# Patient Record
Sex: MALE | Race: WHITE | URBAN - METROPOLITAN AREA
[De-identification: names, ages, dates, MRNs, and addresses within clinical notes are randomized per-mention and may not be internally consistent; named-entity substitution may affect disease eponyms.]

---

## 2024-10-22 ENCOUNTER — APPOINTMENT (OUTPATIENT)
Facility: CLINIC | Age: 34
End: 2024-10-22
Payer: COMMERCIAL

## 2024-10-22 VITALS
HEART RATE: 108 BPM | WEIGHT: 230 LBS | DIASTOLIC BLOOD PRESSURE: 80 MMHG | SYSTOLIC BLOOD PRESSURE: 128 MMHG | BODY MASS INDEX: 31.15 KG/M2 | OXYGEN SATURATION: 97 % | HEIGHT: 72 IN

## 2024-10-22 DIAGNOSIS — K42.9 UMBILICAL HERNIA WITHOUT OBSTRUCTION AND WITHOUT GANGRENE: Primary | ICD-10-CM

## 2024-10-22 PROCEDURE — 99203 OFFICE O/P NEW LOW 30 MIN: CPT | Performed by: SURGERY

## 2024-10-22 PROCEDURE — 3008F BODY MASS INDEX DOCD: CPT | Performed by: SURGERY

## 2024-10-22 PROCEDURE — 1036F TOBACCO NON-USER: CPT | Performed by: SURGERY

## 2024-10-22 RX ORDER — RIZATRIPTAN BENZOATE 10 MG/1
10 TABLET ORAL AS NEEDED
COMMUNITY

## 2024-10-22 RX ORDER — ATENOLOL 25 MG/1
25 TABLET ORAL DAILY
COMMUNITY

## 2024-10-22 ASSESSMENT — ENCOUNTER SYMPTOMS
EYE PAIN: 0
AGITATION: 0
ARTHRALGIAS: 0
FEVER: 0
DYSURIA: 0
VOMITING: 0
POLYPHAGIA: 0
SHORTNESS OF BREATH: 0
FLANK PAIN: 0
COUGH: 0
CONFUSION: 0
HEADACHES: 0
CONSTIPATION: 0
FATIGUE: 0
WEAKNESS: 0
WOUND: 0
SPEECH DIFFICULTY: 0
NAUSEA: 0
CHILLS: 0
ABDOMINAL PAIN: 1
DIARRHEA: 0
FREQUENCY: 0
HEMATURIA: 0
MYALGIAS: 0
EYE REDNESS: 0
BRUISES/BLEEDS EASILY: 0

## 2024-10-22 NOTE — PATIENT INSTRUCTIONS
1.  Surgery for repair of your bellybutton hernia is scheduled for Monday, 11/18/2024.  Please, read the patient preoperative instruction sheet BEFORE your surgery.

## 2024-10-22 NOTE — PROGRESS NOTES
GENERAL SURGERY OFFICE NOTE    Patient: Luiz Carnes    Age: 34 y.o.   Gender: male    MRN: [id number]    PCP: No primary care provider on file.        SUBJECTIVE     Chief Complaint  New Patient Visit (Patient is a self referral for possible umbilical hernia. Patient states that the hernia appeared slowly, he is unsure how long he has had the hernia. Patient states that he has a bulge underneath of belly button. Patient states that the bulge is soft. Patient states that the hernia does cause him pain at times. )       CARLO Dee is a 34-year-old white male who referred himself to the office for evaluation of an umbilical hernia.  He states he has noticed a lump on the inferior aspect of his umbilicus for approximately 2 years.  He did have an infection in the umbilical region about a year ago which was treated with oral antibiotics.  He has not had any drainage since that time.  Initially, he did not notice any pain.  In the last few months, he has had some mild discomfort associated with the bulge.  He does feel that the bulge is getting larger.  He used to work at [employer] where he did a lot of repetitive heavy lifting which may have caused some irritation to the hernia.  He has not had any obstructive symptoms.  Previous surgical intervention only was an open appendectomy in 2016.  He has not had any radiographic evaluation for the hernia.    ROS  Review of Systems   Constitutional:  Negative for chills, fatigue and fever.   HENT:  Negative for congestion, ear pain and hearing loss.    Eyes:  Negative for pain and redness.   Respiratory:  Negative for cough and shortness of breath.    Cardiovascular:  Negative for chest pain and leg swelling.   Gastrointestinal:  Positive for abdominal pain. Negative for constipation, diarrhea, nausea and vomiting.   Endocrine: Negative for polyphagia.   Genitourinary:  Negative for dysuria, flank pain, frequency and hematuria.   Musculoskeletal:  Negative for arthralgias  and myalgias.   Skin:  Negative for rash and wound.   Allergic/Immunologic: Negative for immunocompromised state.   Neurological:  Negative for speech difficulty, weakness and headaches.   Hematological:  Does not bruise/bleed easily.   Psychiatric/Behavioral:  Negative for agitation and confusion.           HISTORY   History reviewed. No pertinent past medical history.     Past Surgical History:   Procedure Laterality Date    APPENDECTOMY      WISDOM TOOTH EXTRACTION          Family History   Problem Relation Name Age of Onset    Hypertension Mother Latasha     Skin cancer Mother Latasha         No Known Allergies     Social History     Tobacco Use   Smoking Status Never   Smokeless Tobacco Never        Social History     Substance and Sexual Activity   Alcohol Use Not Currently        HOME MEDICATIONS  Current Outpatient Medications   Medication Instructions    atenolol (TENORMIN) 25 mg, oral, Daily    rizatriptan (MAXALT) 10 mg, oral, As needed, Take at onset of headache. May repeat once in 2 hours if needed          OBJECTIVE   Last Recorded Vitals.  Blood pressure 128/80, pulse 108, height 1.829 m (6'), weight 104 kg (230 lb), SpO2 97%.     PHYSICAL EXAM  Physical Exam   General: Well-developed, well-nourished and in no acute distress.  Head: Normocephalic. Atraumatic.  Neck/thyroid: Neck is supple.   Eyes: Pupils equal round and reactive to light. Conjunctiva normal.  ENMT: No masses or deformity of external nose. External ears without masses.  Respiratory/Chest:  Normal respiratory effort.  Cardiovascular: Regular rate and rhythm.   Abdomen: Soft, nontender, nondistended.  On the inferior aspect of the umbilical stalk, there is a visible bulge measuring 3 x 3 cm with partially reducible contents.  No erythema.  No tenderness on exam.  Well-healed right lower quadrant oblique incision consistent with previous surgery.  No obvious hernia at the incision site.  Musculoskeletal: Joints and limbs are grossly normal.  Normal gait. Normal range of motion of major joints.  Neuro: Oriented to person, place and time. No obvious neurological deficit. Motor strength grossly normal.  Psych: Normal mood and affect.    RESULTS   Labs  No results found for this or any previous visit (from the past 24 hours).    Radiology Resutls  No results found.       ASSESSMENT / PLAN   Diagnoses and all orders for this visit:  Umbilical hernia without obstruction and without gangrene  -     Case Request Operating Room: Repair Umbilical Hernia      Plan  1.  The benefits and risk of an open umbilical hernia repair with mesh surgery were reviewed with the patient, and he was given educational material.  Risk of surgery included, but not limited to, infection, bleeding, injury to surrounding tissue, possible need for other procedure, recurrence of the hernia, nonresolution of all symptoms, allergic reaction to medication and even death.  2.  Feel that with the size of the hernia defect, he likely will require mesh placement to reduce risk of recurrent hernia.  3.  Surgery is tentatively scheduled for 11/18/2024.  4.  Discussed postoperative lifting restrictions for 4 weeks.  He stated that he is currently unemployed.      Kenzie Stanton MD, FACS   [facility]  [address] [place] [address];   [facility] [address]; [address]  [place]  [address] [phone]

## 2024-10-31 ENCOUNTER — ANESTHESIA EVENT (OUTPATIENT)
Dept: OPERATING ROOM | Facility: HOSPITAL | Age: 34
End: 2024-10-31
Payer: COMMERCIAL

## 2024-11-15 ENCOUNTER — PREP FOR PROCEDURE (OUTPATIENT)
Dept: SURGERY | Facility: HOSPITAL | Age: 34
End: 2024-11-15
Payer: COMMERCIAL

## 2024-11-15 NOTE — H&P
GENERAL SURGERY PREOP H&P     Patient: Luiz Carnes    Age: 34 y.o.   Gender: male    MRN: [id number]    PCP: No primary care provider on file.          SUBJECTIVE      HPI  Luiz is a 34-year-old white male who presents for an open umbilical hernia repair with mesh surgery.  He states he has noticed a lump on the inferior aspect of his umbilicus for approximately 2 years.  He did have an infection in the umbilical region about a year ago which was treated with oral antibiotics.  He has not had any drainage since that time.  Initially, he did not notice any pain.  In the last few months, he has had some mild discomfort associated with the bulge.  He does feel that the bulge is getting larger.  He used to work at [employer] where he did a lot of repetitive heavy lifting which may have caused some irritation to the hernia.  He has not had any obstructive symptoms.  Previous surgical intervention only was an open appendectomy in 2016.  He has not had any radiographic evaluation for the hernia.     ROS  Review of Systems   Constitutional:  Negative for chills, fatigue and fever.   HENT:  Negative for congestion, ear pain and hearing loss.    Eyes:  Negative for pain and redness.   Respiratory:  Negative for cough and shortness of breath.    Cardiovascular:  Negative for chest pain and leg swelling.   Gastrointestinal:  Positive for abdominal pain. Negative for constipation, diarrhea, nausea and vomiting.   Endocrine: Negative for polyphagia.   Genitourinary:  Negative for dysuria, flank pain, frequency and hematuria.   Musculoskeletal:  Negative for arthralgias and myalgias.   Skin:  Negative for rash and wound.   Allergic/Immunologic: Negative for immunocompromised state.   Neurological:  Negative for speech difficulty, weakness and headaches.   Hematological:  Does not bruise/bleed easily.   Psychiatric/Behavioral:  Negative for agitation and confusion.             HISTORY   Medical History   History reviewed. No  pertinent past medical history.         Surgical History         Past Surgical History:   Procedure Laterality Date    APPENDECTOMY        WISDOM TOOTH EXTRACTION                Family History          Family History   Problem Relation Name Age of Onset    Hypertension Mother Latasha      Skin cancer Mother Latasha              Allergies   No Known Allergies         Tobacco Use History   Social History          Tobacco Use   Smoking Status Never   Smokeless Tobacco Never            Social History          Substance and Sexual Activity   Alcohol Use Not Currently         HOME MEDICATIONS       Current Outpatient Medications   Medication Instructions    atenolol (TENORMIN) 25 mg, oral, Daily    rizatriptan (MAXALT) 10 mg, oral, As needed, Take at onset of headache. May repeat once in 2 hours if needed            OBJECTIVE   Last Recorded Vitals.  Blood pressure 128/80, pulse 108, height 1.829 m (6'), weight 104 kg (230 lb), SpO2 97%.      PHYSICAL EXAM  Physical Exam   General: Well-developed, well-nourished and in no acute distress.  Head: Normocephalic. Atraumatic.  Neck/thyroid: Neck is supple.   Eyes: Pupils equal round and reactive to light. Conjunctiva normal.  ENMT: No masses or deformity of external nose. External ears without masses.  Respiratory/Chest:  Normal respiratory effort.  Cardiovascular: Regular rate and rhythm.   Abdomen: Soft, nontender, nondistended.  On the inferior aspect of the umbilical stalk, there is a visible bulge measuring 3 x 3 cm with partially reducible contents.  No erythema.  No tenderness on exam.  Well-healed right lower quadrant oblique incision consistent with previous surgery.  No obvious hernia at the incision site.  Musculoskeletal: Joints and limbs are grossly normal. Normal gait. Normal range of motion of major joints.  Neuro: Oriented to person, place and time. No obvious neurological deficit. Motor strength grossly normal.  Psych: Normal mood and affect.     RESULTS    Labs  No results found for this or any previous visit (from the past 24 hours).     Radiology Resutls  No results found.         ASSESSMENT / PLAN   Diagnoses and all orders for this visit:  Umbilical hernia without obstruction and without gangrene  -     Case Request Operating Room: Repair Umbilical Hernia        Plan  1.  The benefits and risk of an open umbilical hernia repair with mesh surgery were reviewed with the patient, and he was given educational material.  Risk of surgery included, but not limited to, infection, bleeding, injury to surrounding tissue, possible need for other procedure, recurrence of the hernia, nonresolution of all symptoms, allergic reaction to medication and even death.  2.  Feel that with the size of the hernia defect, he likely will require mesh placement to reduce risk of recurrent hernia.  3.  Surgery is tentatively scheduled for 11/18/2024.  4.  Discussed postoperative lifting restrictions for 4 weeks.  He stated that he is currently unemployed.        Kenzie Stanton MD, FACS   [facility]  [address] [place] [address];   [facility] [address]; [address]  [place]  [address] [phone]

## 2024-11-15 NOTE — H&P (VIEW-ONLY)
GENERAL SURGERY PREOP H&P     Patient: Luiz Carnes    Age: 34 y.o.   Gender: male    MRN: [id number]    PCP: No primary care provider on file.          SUBJECTIVE      HPI  Luiz is a 34-year-old white male who presents for an open umbilical hernia repair with mesh surgery.  He states he has noticed a lump on the inferior aspect of his umbilicus for approximately 2 years.  He did have an infection in the umbilical region about a year ago which was treated with oral antibiotics.  He has not had any drainage since that time.  Initially, he did not notice any pain.  In the last few months, he has had some mild discomfort associated with the bulge.  He does feel that the bulge is getting larger.  He used to work at [employer] where he did a lot of repetitive heavy lifting which may have caused some irritation to the hernia.  He has not had any obstructive symptoms.  Previous surgical intervention only was an open appendectomy in 2016.  He has not had any radiographic evaluation for the hernia.     ROS  Review of Systems   Constitutional:  Negative for chills, fatigue and fever.   HENT:  Negative for congestion, ear pain and hearing loss.    Eyes:  Negative for pain and redness.   Respiratory:  Negative for cough and shortness of breath.    Cardiovascular:  Negative for chest pain and leg swelling.   Gastrointestinal:  Positive for abdominal pain. Negative for constipation, diarrhea, nausea and vomiting.   Endocrine: Negative for polyphagia.   Genitourinary:  Negative for dysuria, flank pain, frequency and hematuria.   Musculoskeletal:  Negative for arthralgias and myalgias.   Skin:  Negative for rash and wound.   Allergic/Immunologic: Negative for immunocompromised state.   Neurological:  Negative for speech difficulty, weakness and headaches.   Hematological:  Does not bruise/bleed easily.   Psychiatric/Behavioral:  Negative for agitation and confusion.             HISTORY   Medical History   History reviewed. No  pertinent past medical history.         Surgical History         Past Surgical History:   Procedure Laterality Date    APPENDECTOMY        WISDOM TOOTH EXTRACTION                Family History          Family History   Problem Relation Name Age of Onset    Hypertension Mother Latasha      Skin cancer Mother Latasha              Allergies   No Known Allergies         Tobacco Use History   Social History          Tobacco Use   Smoking Status Never   Smokeless Tobacco Never            Social History          Substance and Sexual Activity   Alcohol Use Not Currently         HOME MEDICATIONS       Current Outpatient Medications   Medication Instructions    atenolol (TENORMIN) 25 mg, oral, Daily    rizatriptan (MAXALT) 10 mg, oral, As needed, Take at onset of headache. May repeat once in 2 hours if needed            OBJECTIVE   Last Recorded Vitals.  Blood pressure 128/80, pulse 108, height 1.829 m (6'), weight 104 kg (230 lb), SpO2 97%.      PHYSICAL EXAM  Physical Exam   General: Well-developed, well-nourished and in no acute distress.  Head: Normocephalic. Atraumatic.  Neck/thyroid: Neck is supple.   Eyes: Pupils equal round and reactive to light. Conjunctiva normal.  ENMT: No masses or deformity of external nose. External ears without masses.  Respiratory/Chest:  Normal respiratory effort.  Cardiovascular: Regular rate and rhythm.   Abdomen: Soft, nontender, nondistended.  On the inferior aspect of the umbilical stalk, there is a visible bulge measuring 3 x 3 cm with partially reducible contents.  No erythema.  No tenderness on exam.  Well-healed right lower quadrant oblique incision consistent with previous surgery.  No obvious hernia at the incision site.  Musculoskeletal: Joints and limbs are grossly normal. Normal gait. Normal range of motion of major joints.  Neuro: Oriented to person, place and time. No obvious neurological deficit. Motor strength grossly normal.  Psych: Normal mood and affect.     RESULTS    Labs  No results found for this or any previous visit (from the past 24 hours).     Radiology Resutls  No results found.         ASSESSMENT / PLAN   Diagnoses and all orders for this visit:  Umbilical hernia without obstruction and without gangrene  -     Case Request Operating Room: Repair Umbilical Hernia        Plan  1.  The benefits and risk of an open umbilical hernia repair with mesh surgery were reviewed with the patient, and he was given educational material.  Risk of surgery included, but not limited to, infection, bleeding, injury to surrounding tissue, possible need for other procedure, recurrence of the hernia, nonresolution of all symptoms, allergic reaction to medication and even death.  2.  Feel that with the size of the hernia defect, he likely will require mesh placement to reduce risk of recurrent hernia.  3.  Surgery is tentatively scheduled for 11/18/2024.  4.  Discussed postoperative lifting restrictions for 4 weeks.  He stated that he is currently unemployed.        Kenzie Stanton MD, FACS   [facility]  [address] [place] [address];   [facility] [address]; [address]  [place]  [address] [phone]

## 2024-11-18 ENCOUNTER — ANESTHESIA (OUTPATIENT)
Dept: OPERATING ROOM | Facility: HOSPITAL | Age: 34
End: 2024-11-18
Payer: COMMERCIAL

## 2024-11-18 ENCOUNTER — HOSPITAL ENCOUNTER (OUTPATIENT)
Facility: HOSPITAL | Age: 34
Setting detail: OUTPATIENT SURGERY
Discharge: HOME | End: 2024-11-18
Attending: SURGERY | Admitting: SURGERY
Payer: COMMERCIAL

## 2024-11-18 ENCOUNTER — PHARMACY VISIT (OUTPATIENT)
Dept: PHARMACY | Facility: CLINIC | Age: 34
End: 2024-11-18
Payer: COMMERCIAL

## 2024-11-18 VITALS
OXYGEN SATURATION: 93 % | BODY MASS INDEX: 31.15 KG/M2 | SYSTOLIC BLOOD PRESSURE: 125 MMHG | WEIGHT: 230 LBS | DIASTOLIC BLOOD PRESSURE: 82 MMHG | TEMPERATURE: 97.8 F | HEIGHT: 72 IN | RESPIRATION RATE: 14 BRPM | HEART RATE: 97 BPM

## 2024-11-18 DIAGNOSIS — K42.9 UMBILICAL HERNIA WITHOUT OBSTRUCTION AND WITHOUT GANGRENE: Primary | ICD-10-CM

## 2024-11-18 PROCEDURE — 49593 RPR AA HRN 1ST 3-10 RDC: CPT | Performed by: SURGERY

## 2024-11-18 PROCEDURE — 3700000001 HC GENERAL ANESTHESIA TIME - INITIAL BASE CHARGE: Performed by: SURGERY

## 2024-11-18 PROCEDURE — C1781 MESH (IMPLANTABLE): HCPCS | Performed by: SURGERY

## 2024-11-18 PROCEDURE — 2500000005 HC RX 250 GENERAL PHARMACY W/O HCPCS: Performed by: SURGERY

## 2024-11-18 PROCEDURE — 2780000003 HC OR 278 NO HCPCS: Performed by: SURGERY

## 2024-11-18 PROCEDURE — 2500000004 HC RX 250 GENERAL PHARMACY W/ HCPCS (ALT 636 FOR OP/ED): Performed by: NURSE ANESTHETIST, CERTIFIED REGISTERED

## 2024-11-18 PROCEDURE — 7100000002 HC RECOVERY ROOM TIME - EACH INCREMENTAL 1 MINUTE: Performed by: SURGERY

## 2024-11-18 PROCEDURE — 2500000004 HC RX 250 GENERAL PHARMACY W/ HCPCS (ALT 636 FOR OP/ED): Performed by: ANESTHESIOLOGY

## 2024-11-18 PROCEDURE — 96372 THER/PROPH/DIAG INJ SC/IM: CPT | Performed by: NURSE ANESTHETIST, CERTIFIED REGISTERED

## 2024-11-18 PROCEDURE — 7100000001 HC RECOVERY ROOM TIME - INITIAL BASE CHARGE: Performed by: SURGERY

## 2024-11-18 PROCEDURE — 3600000008 HC OR TIME - EACH INCREMENTAL 1 MINUTE - PROCEDURE LEVEL THREE: Performed by: SURGERY

## 2024-11-18 PROCEDURE — 3600000003 HC OR TIME - INITIAL BASE CHARGE - PROCEDURE LEVEL THREE: Performed by: SURGERY

## 2024-11-18 PROCEDURE — 7100000009 HC PHASE TWO TIME - INITIAL BASE CHARGE: Performed by: SURGERY

## 2024-11-18 PROCEDURE — 2500000004 HC RX 250 GENERAL PHARMACY W/ HCPCS (ALT 636 FOR OP/ED): Mod: JZ | Performed by: SURGERY

## 2024-11-18 PROCEDURE — RXMED WILLOW AMBULATORY MEDICATION CHARGE

## 2024-11-18 PROCEDURE — 7100000010 HC PHASE TWO TIME - EACH INCREMENTAL 1 MINUTE: Performed by: SURGERY

## 2024-11-18 PROCEDURE — 2720000007 HC OR 272 NO HCPCS: Performed by: SURGERY

## 2024-11-18 PROCEDURE — 3700000002 HC GENERAL ANESTHESIA TIME - EACH INCREMENTAL 1 MINUTE: Performed by: SURGERY

## 2024-11-18 DEVICE — VENTRALEX ST HERNIA PATCH, 4.3 CM (1.7"), CIRCLE
Type: IMPLANTABLE DEVICE | Site: UMBILICAL | Status: FUNCTIONAL
Brand: VENTRALEX

## 2024-11-18 RX ORDER — FAMOTIDINE 10 MG/ML
20 INJECTION INTRAVENOUS ONCE
Status: COMPLETED | OUTPATIENT
Start: 2024-11-18 | End: 2024-11-18

## 2024-11-18 RX ORDER — DIPHENHYDRAMINE HYDROCHLORIDE 50 MG/ML
12.5 INJECTION INTRAMUSCULAR; INTRAVENOUS ONCE AS NEEDED
Status: DISCONTINUED | OUTPATIENT
Start: 2024-11-18 | End: 2024-11-18 | Stop reason: HOSPADM

## 2024-11-18 RX ORDER — ROCURONIUM BROMIDE 10 MG/ML
INJECTION, SOLUTION INTRAVENOUS AS NEEDED
Status: DISCONTINUED | OUTPATIENT
Start: 2024-11-18 | End: 2024-11-18

## 2024-11-18 RX ORDER — ONDANSETRON HYDROCHLORIDE 2 MG/ML
4 INJECTION, SOLUTION INTRAVENOUS ONCE AS NEEDED
Status: DISCONTINUED | OUTPATIENT
Start: 2024-11-18 | End: 2024-11-18 | Stop reason: HOSPADM

## 2024-11-18 RX ORDER — MORPHINE SULFATE 2 MG/ML
2 INJECTION, SOLUTION INTRAMUSCULAR; INTRAVENOUS EVERY 5 MIN PRN
Status: DISCONTINUED | OUTPATIENT
Start: 2024-11-18 | End: 2024-11-18 | Stop reason: HOSPADM

## 2024-11-18 RX ORDER — LIDOCAINE HYDROCHLORIDE 20 MG/ML
INJECTION, SOLUTION INFILTRATION; PERINEURAL AS NEEDED
Status: DISCONTINUED | OUTPATIENT
Start: 2024-11-18 | End: 2024-11-18

## 2024-11-18 RX ORDER — BUPIVACAINE HCL/EPINEPHRINE 0.5-1:200K
VIAL (ML) INJECTION AS NEEDED
Status: DISCONTINUED | OUTPATIENT
Start: 2024-11-18 | End: 2024-11-18 | Stop reason: HOSPADM

## 2024-11-18 RX ORDER — CEFAZOLIN SODIUM 2 G/100ML
2 INJECTION, SOLUTION INTRAVENOUS ONCE
Status: COMPLETED | OUTPATIENT
Start: 2024-11-18 | End: 2024-11-18

## 2024-11-18 RX ORDER — SODIUM CHLORIDE, SODIUM LACTATE, POTASSIUM CHLORIDE, CALCIUM CHLORIDE 600; 310; 30; 20 MG/100ML; MG/100ML; MG/100ML; MG/100ML
100 INJECTION, SOLUTION INTRAVENOUS CONTINUOUS
Status: DISCONTINUED | OUTPATIENT
Start: 2024-11-18 | End: 2024-11-18 | Stop reason: HOSPADM

## 2024-11-18 RX ORDER — OXYCODONE AND ACETAMINOPHEN 5; 325 MG/1; MG/1
1 TABLET ORAL EVERY 4 HOURS PRN
Status: DISCONTINUED | OUTPATIENT
Start: 2024-11-18 | End: 2024-11-18 | Stop reason: HOSPADM

## 2024-11-18 RX ORDER — IBUPROFEN 600 MG/1
600 TABLET ORAL ONCE
Status: DISCONTINUED | OUTPATIENT
Start: 2024-11-18 | End: 2024-11-18

## 2024-11-18 RX ORDER — PROPOFOL 10 MG/ML
INJECTION, EMULSION INTRAVENOUS AS NEEDED
Status: DISCONTINUED | OUTPATIENT
Start: 2024-11-18 | End: 2024-11-18

## 2024-11-18 RX ORDER — LABETALOL HYDROCHLORIDE 5 MG/ML
INJECTION, SOLUTION INTRAVENOUS AS NEEDED
Status: DISCONTINUED | OUTPATIENT
Start: 2024-11-18 | End: 2024-11-18

## 2024-11-18 RX ORDER — HYDRALAZINE HYDROCHLORIDE 20 MG/ML
5 INJECTION INTRAMUSCULAR; INTRAVENOUS EVERY 30 MIN PRN
Status: DISCONTINUED | OUTPATIENT
Start: 2024-11-18 | End: 2024-11-18 | Stop reason: HOSPADM

## 2024-11-18 RX ORDER — MIDAZOLAM HYDROCHLORIDE 1 MG/ML
INJECTION, SOLUTION INTRAMUSCULAR; INTRAVENOUS AS NEEDED
Status: DISCONTINUED | OUTPATIENT
Start: 2024-11-18 | End: 2024-11-18

## 2024-11-18 RX ORDER — DROPERIDOL 2.5 MG/ML
0.62 INJECTION, SOLUTION INTRAMUSCULAR; INTRAVENOUS ONCE AS NEEDED
Status: DISCONTINUED | OUTPATIENT
Start: 2024-11-18 | End: 2024-11-18 | Stop reason: HOSPADM

## 2024-11-18 RX ORDER — ONDANSETRON HYDROCHLORIDE 2 MG/ML
INJECTION, SOLUTION INTRAVENOUS AS NEEDED
Status: DISCONTINUED | OUTPATIENT
Start: 2024-11-18 | End: 2024-11-18

## 2024-11-18 RX ORDER — MEPERIDINE HYDROCHLORIDE 25 MG/ML
12.5 INJECTION INTRAMUSCULAR; INTRAVENOUS; SUBCUTANEOUS EVERY 10 MIN PRN
Status: DISCONTINUED | OUTPATIENT
Start: 2024-11-18 | End: 2024-11-18 | Stop reason: HOSPADM

## 2024-11-18 RX ORDER — SODIUM CHLORIDE 9 MG/ML
125 INJECTION, SOLUTION INTRAVENOUS CONTINUOUS
Status: DISCONTINUED | OUTPATIENT
Start: 2024-11-18 | End: 2024-11-18 | Stop reason: HOSPADM

## 2024-11-18 RX ORDER — LABETALOL HYDROCHLORIDE 5 MG/ML
5 INJECTION, SOLUTION INTRAVENOUS ONCE AS NEEDED
Status: DISCONTINUED | OUTPATIENT
Start: 2024-11-18 | End: 2024-11-18 | Stop reason: HOSPADM

## 2024-11-18 RX ORDER — LIDOCAINE HYDROCHLORIDE 10 MG/ML
0.1 INJECTION, SOLUTION EPIDURAL; INFILTRATION; INTRACAUDAL; PERINEURAL ONCE
Status: DISCONTINUED | OUTPATIENT
Start: 2024-11-18 | End: 2024-11-18 | Stop reason: HOSPADM

## 2024-11-18 RX ORDER — KETOROLAC TROMETHAMINE 30 MG/ML
INJECTION, SOLUTION INTRAMUSCULAR; INTRAVENOUS AS NEEDED
Status: DISCONTINUED | OUTPATIENT
Start: 2024-11-18 | End: 2024-11-18

## 2024-11-18 RX ORDER — HYDROCODONE BITARTRATE AND ACETAMINOPHEN 7.5; 325 MG/1; MG/1
1 TABLET ORAL EVERY 6 HOURS PRN
Qty: 10 TABLET | Refills: 0 | Status: SHIPPED | OUTPATIENT
Start: 2024-11-18

## 2024-11-18 RX ORDER — ACETAMINOPHEN 10 MG/ML
1000 INJECTION, SOLUTION INTRAVENOUS ONCE
Status: COMPLETED | OUTPATIENT
Start: 2024-11-18 | End: 2024-11-18

## 2024-11-18 RX ORDER — ALBUTEROL SULFATE 0.83 MG/ML
2.5 SOLUTION RESPIRATORY (INHALATION) ONCE AS NEEDED
Status: DISCONTINUED | OUTPATIENT
Start: 2024-11-18 | End: 2024-11-18 | Stop reason: HOSPADM

## 2024-11-18 RX ORDER — FENTANYL CITRATE 50 UG/ML
INJECTION, SOLUTION INTRAMUSCULAR; INTRAVENOUS AS NEEDED
Status: DISCONTINUED | OUTPATIENT
Start: 2024-11-18 | End: 2024-11-18

## 2024-11-18 SDOH — HEALTH STABILITY: MENTAL HEALTH: CURRENT SMOKER: 0

## 2024-11-18 ASSESSMENT — PAIN DESCRIPTION - DESCRIPTORS
DESCRIPTORS: HEADACHE
DESCRIPTORS: HEADACHE

## 2024-11-18 ASSESSMENT — PAIN SCALES - GENERAL
PAINLEVEL_OUTOF10: 0 - NO PAIN
PAINLEVEL_OUTOF10: 4
PAINLEVEL_OUTOF10: 0 - NO PAIN
PAINLEVEL_OUTOF10: 0 - NO PAIN
PAINLEVEL_OUTOF10: 7
PAIN_LEVEL: 0
PAINLEVEL_OUTOF10: 0 - NO PAIN
PAINLEVEL_OUTOF10: 0 - NO PAIN
PAINLEVEL_OUTOF10: 7
PAINLEVEL_OUTOF10: 0 - NO PAIN

## 2024-11-18 ASSESSMENT — COLUMBIA-SUICIDE SEVERITY RATING SCALE - C-SSRS
6. HAVE YOU EVER DONE ANYTHING, STARTED TO DO ANYTHING, OR PREPARED TO DO ANYTHING TO END YOUR LIFE?: NO
1. IN THE PAST MONTH, HAVE YOU WISHED YOU WERE DEAD OR WISHED YOU COULD GO TO SLEEP AND NOT WAKE UP?: NO
2. HAVE YOU ACTUALLY HAD ANY THOUGHTS OF KILLING YOURSELF?: NO

## 2024-11-18 ASSESSMENT — PAIN - FUNCTIONAL ASSESSMENT
PAIN_FUNCTIONAL_ASSESSMENT: 0-10
PAIN_FUNCTIONAL_ASSESSMENT: 0-10

## 2024-11-18 NOTE — ANESTHESIA POSTPROCEDURE EVALUATION
Patient: Luiz Carnes    Procedure Summary       Date: 11/18/24 Room / Location: [facility] OR 01 / Virtual [facility] OR    Anesthesia Start: 1043 Anesthesia Stop: 1213    Procedure: Repair Umbilical Hernia Diagnosis:       Umbilical hernia without obstruction and without gangrene      (Umbilical hernia without obstruction and without gangrene [K42.9])    Surgeons: Kenzie Stanton MD Responsible Provider: ELDER Luevano    Anesthesia Type: general ASA Status: 2            Anesthesia Type: general    Vitals Value Taken Time   /84 11/18/24 1240   Temp 36.6 °C (97.8 °F) 11/18/24 1206   Pulse 101 11/18/24 1240   Resp 22 11/18/24 1240   SpO2 94 % 11/18/24 1240       Anesthesia Post Evaluation    Patient location during evaluation: PACU  Patient participation: complete - patient participated  Level of consciousness: awake  Pain score: 0  Pain management: adequate  Airway patency: patent  Cardiovascular status: acceptable  Respiratory status: acceptable  Hydration status: acceptable  Postoperative Nausea and Vomiting: none    No notable events documented.

## 2024-11-18 NOTE — ANESTHESIA PREPROCEDURE EVALUATION
Patient: Luiz Carnes    Procedure Information       Date/Time: 11/18/24 0945    Procedure: Repair Umbilical Hernia - 1.5 hours, 10:00 am ?    Location: [facility] OR 01 / Virtual [facility] OR    Surgeons: Kenzie Stanton MD            Relevant Problems   Anesthesia (within normal limits)       Clinical information reviewed:   Tobacco  Allergies  Meds   Med Hx  Surg Hx   Fam Hx  Soc Hx        NPO Detail:  No data recorded     Physical Exam    Airway  Mallampati: III  TM distance: >3 FB  Neck ROM: full     Cardiovascular - normal exam     Dental - normal exam     Pulmonary - normal exam     Abdominal - normal exam         Anesthesia Plan    History of general anesthesia?: yes  History of complications of general anesthesia?: no    ASA 2     general     The patient is not a current smoker.    intravenous induction   Postoperative administration of opioids is intended.  Anesthetic plan and risks discussed with patient.    Plan discussed with CRNA.

## 2024-11-18 NOTE — ANESTHESIA PROCEDURE NOTES
Airway  Date/Time: 11/18/2024 11:03 AM  Urgency: elective    Airway not difficult    Staffing  Performed: CRNA   Authorized by: ELDER Luevano    Performed by: ELDRE Luevano  Patient location during procedure: OR    Indications and Patient Condition  Indications for airway management: anesthesia  Spontaneous Ventilation: absent  Sedation level: deep  Preoxygenated: yes  Patient position: sniffing  MILS maintained throughout  Mask difficulty assessment: 2 - vent by mask + OA or adjuvant +/- NMBA    Final Airway Details  Final airway type: endotracheal airway      Successful airway: ETT     Successful intubation technique: video laryngoscopy  Blade size: #3  ETT size (mm): 7.5  Cormack-Lehane Classification: grade III - view of epiglottis only  Placement verified by: chest auscultation and capnometry   Measured from: lips  ETT to lips (cm): 22  Number of attempts at approach: 1

## 2024-11-18 NOTE — DISCHARGE INSTRUCTIONS
DISCHARGE INSTRUCTIONS    Patient: Luiz Carnes  Surgery Date: 11/18/2024    Age: 34 y.o.   Gender: male  Attending: Kenzie Stanton MD    MRN: [id number]  OR Location: [facility] OR    PCP: No primary care provider on file.           SURGERY INFORMATION   Procedure performed: Procedure(s):  Repair Umbilical Hernia   Post-Op diagnosis: Post-op Diagnosis     * Umbilical hernia without obstruction and without gangrene [K42.9]   Surgeon:    * Kenzie Stanton - Primary     ACTIVITY RESTRICTIONS   1.  Do not engage in sports, heavy work or lifting until cleared by Dr. Stanton.    2.  Do not lift/pull/push more than 10 pounds for 2 weeks.   3.  Do not do repetitive bending over/picking up objects off the floor, as this puts strain on your incisions on your abdomen.  4.  You may drive when off of narcotic pain medication.  5.  Continue wearing the compression stockings (BOBY hose) until you are walking at least 3 times per day.    BATHING   You may shower starting the day after your surgery. You may shower with your dressing on.  You may use the Hibiclens soap (the liquid soap you used prior to surgery) at your surgical sites.  However, do not use this soap more than 3 times per week.    WOUND CARE / DRAIN CARE   Remove the plastic dressing 5 days after your surgery.  Pat dry the Steri-Strips after your shower.  You do NOT need to cover the Steri-Strips after your shower unless there is bleeding or drainage.  2.   Allow the Steri-Strips to fall off on their own.  3.   Apply ice to your surgical area for 20 minutes 3 times a day to help with pain and swelling.  4.   You may also use a heating pad to your abdomen to help with pain, as a heating pad will reduce abdominal spasms.    MEDICATIONS   A narcotic pain medication has been prescribed.  Use this medication only AS NEEDED for severe pain.  2.   You may use Tylenol, or ibuprofen, in addition to, or instead of, your narcotic pain medication.  3.   Resume all home  medications unless previously discussed with your surgeon. Blood thinners can be restarted the day after your surgery unless there is significant bleeding.    DIET   Diet as tolerated.   For the first 24 hours after surgery, it is recommended that you eat light meals.  Some nausea and vomiting is common for the first 24 hours after surgery.  Drink plenty of fluids.  Minimize your use of caffeinated beverages.    CALLL YOUR SURGEON IF:   Any evidence of infection at your sugical site which can include redness or drainage. Some clear or pinkish drainage is normal for a few days following surgery.  2.   Excessive bleeding from your surgical site. If there is a small amount of bleeding, apply pressure for 20 minutes, then recheck the wound. If the bleeding does not stop, please call your surgeon's office.  3.   Some nausea and vomiting is expected for the first 24 hours after surgery. If you are unable to keep down fluids, or the nausea/vomiting continues beyond 24 hours.  4.   If you are unable to urinate within 8-12 hours after discharge from the hospital.  5.   A low-grade fever after surgery is normal. Notify the office if your temperature goes above 101 degrees.  6.   Any other concerns or questions you have regarding your surgery.      Kenzie Stanton MD, FACS   [facility]  [address] [facility];   [employer] [address]; [address]  [place]  [address] [phone]

## 2024-11-18 NOTE — OP NOTE
Repair Umbilical Hernia Operative Note     Date: 2024  OR Location: [facility]    Name: Luiz Carnes, : 1990, Age: 34 y.o., MRN: [id number], Sex: male    Diagnosis  Pre-op Diagnosis      * Umbilical hernia without obstruction and without gangrene [K42.9] Post-op Diagnosis     * Umbilical hernia without obstruction and without gangrene [K42.9]     Procedures  Repair Umbilical Hernia  00488 - LA RPR AA HERNIA 1ST 3-10 CM REDUCIBLE      Surgeons      * Kenzie Stanton - Primary    Resident/Fellow/Other Assistant:      Staff:   Circulator: Jojo  Scrub Person: Katie Mezaub Person: Janet    Anesthesia Staff: CRNA: JUANJOSE Luevano-CRNA    Procedure Summary  Anesthesia: General  ASA: II  Estimated Blood Loss: 5mL  Intra-op Medications:   Administrations occurring from 0945 to 1145 on 24:   Medication Name Total Dose   dexAMETHasone (Decadron) injection 4 mg/mL 8 mg   fentaNYL (Sublimaze) injection 50 mcg/mL 150 mcg   labetalol 5 mg/mL 20 mg   lidocaine (Xylocaine) injection 2 % 50 mg   midazolam (Versed) injection 1 mg/mL 2 mg   ondansetron (Zofran) 2 mg/mL injection 4 mg   propofol (Diprivan) injection 10 mg/mL 200 mg   rocuronium (ZeMuron) 50 mg/5 mL injection 50 mg   acetaminophen (Ofirmev) injection 1,000 mg Cannot be calculated   ceFAZolin (Ancef) 2 g in dextrose (iso)  mL 2 g              Anesthesia Record               Intraprocedure I/O Totals          Intake    sodium chloride 0.9% infusion 800.00 mL    Total Intake 800 mL       Output    Est. Blood Loss 5 mL    Total Output 5 mL       Net    Net Volume 795 mL          Specimen: No specimens collected              Drains and/or Catheters: * None in log *    Tourniquet Times:         Implants:  Implants       Type Name Action Serial No.      Surgical Mesh Sling Implant PATCH, HERNIA, VENTRALEX ST, SMALL, 1.7 X 1.7 - [id number] Implanted               Findings: 1.5 x 1.5 cm fascial defect with large amount of omentum through the  fascial defect    Indications: Luiz Carnes is an 34 y.o. male who is having surgery for Umbilical hernia without obstruction and without gangrene [K42.9].  He had presented with a bulge just below the base of the umbilicus.  This was not completely reducible.  The benefits and risk of an open umbilical hernia repair with mesh surgery was reviewed with the patient preoperatively and he signed consent.  The area was marked and confirmed by the patient.    The patient was seen in the preoperative area. The risks, benefits, complications, treatment options, non-operative alternatives, expected recovery and outcomes were discussed with the patient. The possibilities of reaction to medication, pulmonary aspiration, injury to surrounding structures, bleeding, recurrent infection, the need for additional procedures, failure to diagnose a condition, and creating a complication requiring transfusion or operation were discussed with the patient. The patient concurred with the proposed plan, giving informed consent.  The site of surgery was properly noted/marked if necessary per policy. The patient has been actively warmed in preoperative area. Preoperative antibiotics have been ordered and given within 1 hours of incision. Venous thrombosis prophylaxis have been ordered including bilateral sequential compression devices    Procedure Details:   He was brought into the operating room and was laid in the supine position.  After placement under general anesthesia, knee-high BOBY hose and SCDs were placed on bilateral lower extremities.  The abdomen then was prepped with ChloraPrep and draped in usual sterile fashion.  A pause was taken the correct patient procedure were identified.    Attention was first placed towards the umbilicus.  The bulge appeared to be just below the base of the umbilicus.  Just below the bulge, this area was locally anesthetized with half percent Marcaine with epinephrine.  A 4 cm horizontal incision was  made below the base of the umbilicus.  This incision was carried down into the level of the hernia sac which was grasped and dissected out from the surrounding soft tissue.  The hernia sac was opened up and the contents appeared to be omentum.  The hernia sac was opened up down to the level of the fascial defect.  The fascial defect appeared to be quite small compared to the amount of omentum coming through the fascial defect.  Once the contents were reduced, the fascial defect measured approximately 1.5 x 1.5 cm.  The edges of the fascia then were cleaned off.  A 4.3 [place] mesh with strap then was used to repair the fascial defect.  The mesh was introduced into the intra-abdominal cavity with the rough side against the posterior fascia and the smooth side against the omentum.  Multiple figure-of-eight stitches of 0 Ethibond suture were used to close the fascial defect and incorporating the strap into the fascial defect closure.  Once the sutures were in place, the strap was cut flush with the fascial defect.  The sutures were tied.  An additional figure-of-eight stitch was placed centrally to close all of the fascia over the mesh.  Half percent Marcaine with epinephrine was used to locally anesthetize the area.  The wound then was irrigated.  There was no evidence of any active bleeding.  The base of the umbilicus then was tacked down to the anterior fascia using an interrupted stitch of 0 Ethibond suture.  The deep tissue then was reapproximated using interrupted stitches of 0 Vicryl suture, and the skin was reapproximated using a running subcuticular stitch of 4-0 Vicryl suture.  The wound then was dressed with a combination of Steri-Strips, cottonball, 4 x 4 gauze and Tegaderm tape.  Patient tolerated the procedure well.  He then was awoken from anesthesia and taken to the recovery room in stable condition.    Counts: Correct x 2  Complications: None.  Drains: None.    Complications:  None; patient tolerated  the procedure well.    Disposition: PACU - hemodynamically stable.  Condition: stable                 Additional Details:       Attending Attestation: I was present for the entire procedure.    Kenzie Stanton  Phone Number: [phone]

## 2024-12-03 ENCOUNTER — APPOINTMENT (OUTPATIENT)
Facility: CLINIC | Age: 34
End: 2024-12-03
Payer: COMMERCIAL

## 2024-12-03 VITALS
DIASTOLIC BLOOD PRESSURE: 60 MMHG | SYSTOLIC BLOOD PRESSURE: 110 MMHG | HEIGHT: 72 IN | WEIGHT: 226.4 LBS | BODY MASS INDEX: 30.66 KG/M2 | HEART RATE: 105 BPM | OXYGEN SATURATION: 97 %

## 2024-12-03 DIAGNOSIS — K42.9 UMBILICAL HERNIA WITHOUT OBSTRUCTION AND WITHOUT GANGRENE: Primary | ICD-10-CM

## 2024-12-03 PROCEDURE — 3008F BODY MASS INDEX DOCD: CPT | Performed by: SURGERY

## 2024-12-03 PROCEDURE — 99212 OFFICE O/P EST SF 10 MIN: CPT | Performed by: SURGERY

## 2024-12-03 ASSESSMENT — ENCOUNTER SYMPTOMS
WOUND: 0
CONSTIPATION: 0
DIARRHEA: 0
MYALGIAS: 0
SHORTNESS OF BREATH: 0
FEVER: 0
FATIGUE: 0
DYSURIA: 0
HEADACHES: 0
WEAKNESS: 0
BRUISES/BLEEDS EASILY: 0
AGITATION: 0
ARTHRALGIAS: 0
CONFUSION: 0
POLYPHAGIA: 0
CHILLS: 0
NAUSEA: 0
FLANK PAIN: 0
EYE PAIN: 0
FREQUENCY: 0
EYE REDNESS: 0
SPEECH DIFFICULTY: 0
HEMATURIA: 0
COUGH: 0
VOMITING: 0
ABDOMINAL PAIN: 1

## 2024-12-03 NOTE — PROGRESS NOTES
GENERAL SURGERY OFFICE NOTE    Patient: Luiz Carnes    Age: 34 y.o.   Gender: male    MRN: [id number]    PCP: No primary care provider on file.        SUBJECTIVE     Chief Complaint  Follow-up (Patient is here for a 2 week post op umbilical hernia repair. Patient states that he is healing well. )       CARLO Dee returns to the office for 2-week postop check after undergoing an open umbilical hernia repair with mesh surgery.  At the time of his surgery, he was found to have a 1.5 x 1.5 cm fascial defect with a large amount of omentum coming through the fascial defect.  This was repaired with a 4.3 [place] mesh with strap.  Since surgery he did take all 10 of his Norco tablets, but is not currently taking any medication for his abdomen.  He continues to take a daily Tylenol due to his migraine headaches which is chronic.  He initially had some constipation.  He states that his stools have been soft in the last week, but he is not having watery diarrhea multiple times a day.  He is back to eating and drinking well without any nausea or vomiting.  No fevers.  No bleeding or drainage from his incision site.    ROS  Review of Systems   Constitutional:  Negative for chills, fatigue and fever.   HENT:  Negative for congestion, ear pain and hearing loss.    Eyes:  Negative for pain and redness.   Respiratory:  Negative for cough and shortness of breath.    Cardiovascular:  Negative for chest pain and leg swelling.   Gastrointestinal:  Positive for abdominal pain. Negative for constipation, diarrhea, nausea and vomiting.   Endocrine: Negative for polyphagia.   Genitourinary:  Negative for dysuria, flank pain, frequency and hematuria.   Musculoskeletal:  Negative for arthralgias and myalgias.   Skin:  Negative for rash and wound.   Allergic/Immunologic: Negative for immunocompromised state.   Neurological:  Negative for speech difficulty, weakness and headaches.   Hematological:  Does not bruise/bleed easily.    Psychiatric/Behavioral:  Negative for agitation and confusion.           HISTORY     Past Medical History:   Diagnosis Date    Umbilical hernia without obstruction and without gangrene 10/22/2024        Past Surgical History:   Procedure Laterality Date    APPENDECTOMY  2016    UMBILICAL HERNIA REPAIR  11/18/2024    WISDOM TOOTH EXTRACTION          Family History   Problem Relation Name Age of Onset    Hypertension Mother Latasha gonsales     Skin cancer Mother Latasha gonsales     Cancer Mother Latasha gonsales         No Known Allergies     Social History     Tobacco Use   Smoking Status Never   Smokeless Tobacco Never        Social History     Substance and Sexual Activity   Alcohol Use Not Currently        HOME MEDICATIONS  Current Outpatient Medications   Medication Instructions    atenolol (TENORMIN) 25 mg, Daily    rizatriptan (MAXALT) 10 mg, As needed          OBJECTIVE   Last Recorded Vitals.  Blood pressure 110/60, pulse 105, height 1.829 m (6'), weight 103 kg (226 lb 6.4 oz), SpO2 97%.     PHYSICAL EXAM  Physical Exam   General: Well-developed, well-nourished and in no acute distress.  Head: Normocephalic. Atraumatic.  Neck/thyroid: Neck is supple.   Eyes: Pupils equal round and reactive to light. Conjunctiva normal.  ENMT: No masses or deformity of external nose. External ears without masses.  Respiratory/Chest:  Normal respiratory effort.  Cardiovascular: Regular rate and rhythm.   Abdomen: Soft, nontender, nondistended.  Just below the umbilicus, the surgical incision is healing well.  Appropriate induration at the surgical site and at the base of the umbilicus without any erythema or drainage.   No tenderness on exam.  Well-healed right lower quadrant oblique incision consistent with previous surgery.  No obvious hernia at the incision site.  Musculoskeletal: Joints and limbs are grossly normal. Normal gait. Normal range of motion of major joints.  Neuro: Oriented to person, place and time. No obvious  neurological deficit. Motor strength grossly normal.  Psych: Normal mood and affect.    RESULTS   Labs  No results found for this or any previous visit (from the past 24 hours).    Radiology Resutls  No results found.       ASSESSMENT / PLAN   Diagnoses and all orders for this visit:  Umbilical hernia without obstruction and without gangrene        Plan  1.  The induration at the surgical site in the base of the umbilicus is anticipated given his postoperative status.  This ought to resolve without any intervention.  2.  Reviewed postoperative wound care and activity restrictions to try to reduce his risk of recurrent hernia.  3.  He is referred back to his primary care physician for all further medical care, but may be referred back to my office for any further surgical needs.  4.  Discussed postoperative lifting restrictions for 4 weeks.  He stated that he is currently unemployed.      Kenzie Stanton MD, FACS  [facility]  [address] [place] [address];   [address]  [place]  [address] [phone]

## 2024-12-03 NOTE — PATIENT INSTRUCTIONS
1.  Do not do any lifting more than 10 pounds for 2 more weeks.  Then you may gradually increase your physical activity as tolerated.  Avoid any activity that causes pain at your incision site for the next 3 months.  2.  The swelling around your surgical site and at the base of your bellybutton is normal.  This ought to resolve in the next 1 to 2 months.  3.  If you have any other questions regarding your hernia surgery, please call Dr. Stanton's office.  [phone]

## (undated) DEVICE — DRAPE, SHEET, ENDOSCOPY, GENERAL, FENESTRATED, ARMBOARD COVER, 98 X 123.5 IN, DISPOSABLE, LF, STERILE

## (undated) DEVICE — SPONGE, TONSIL, RADIOPAQUE, ROUND, W/O STRING, RONDIC, XLARGE, NS

## (undated) DEVICE — COVER HANDLE LIGHT, STERIS, BLUE, STERILE

## (undated) DEVICE — NEEDLE, HYPODERMIC, REGULAR WALL, REGULAR BEVEL, 22 G X 1.5 IN

## (undated) DEVICE — SUTURE, VICRYL, 2-0, 18 IN, TIE, VIOLET

## (undated) DEVICE — SUTURE, ETHIBOND, XTRA, 30 IN, 0, CT-1, GREEN

## (undated) DEVICE — TAPE, CLOTH, HYPOALLERGENIC, MEDIPORE HI SOFT, 6 IN X 10 YD

## (undated) DEVICE — DRESSING, GAUZE, SPONGE, VERSALON, ALL PURPOSE, 4 X 4 IN, SOFT

## (undated) DEVICE — Device

## (undated) DEVICE — GLOVE, PROTEXIS PI CLASSIC, SZ-7.0, PF, LF

## (undated) DEVICE — SOLUTION, IRRIGATION, SODIUM CHLORIDE 0.9%, 1000 ML, POUR BOTTLE

## (undated) DEVICE — SUTURE, VICRYL, 0, 36 IN, CT-1, UNDYED

## (undated) DEVICE — PAD, GROUNDING, ELECTROSURGICAL, W/9 FT CABLE, POLYHESIVE II, ADULT, LF

## (undated) DEVICE — SUTURE, VICRYL, 4-0, 18 IN, UNDYED BR PS-2

## (undated) DEVICE — APPLICATOR, CHLORAPREP, W/ORANGE TINT, 26ML

## (undated) DEVICE — TOWEL PACK, STERILE, 4/PACK, BLUE

## (undated) DEVICE — SYRINGE, 10 CC, LUER LOCK

## (undated) DEVICE — SYRINGE, 30 CC, LUER LOCK

## (undated) DEVICE — SUTURE, CTD, VICRYL, 2-0, UND, BR, CT-2